# Patient Record
(demographics unavailable — no encounter records)

---

## 2025-02-03 NOTE — HISTORY OF PRESENT ILLNESS
[FreeTextEntry1] : 60 y/o male with h/o cad, epilepsy, htn, crohn's disease, obesity, hl, predm, family h/o early cvd who presents for f/up today.   last seen 8/23  -CTA cor 9/23: Cardiac: 1.  The calcium score is mild at 29 Agatston units, which is at the 56 percentile, adjusted for age, gender and race. 2.  Mild non-obstructive coronary artery disease. Non-cardiac: The visualized aspects of the lungs are unremarkable.  -Echo 9/23: 1. Normal left ventricular cavity size. Left ventricular systolic function is normal. 2. There is normal left ventricular diastolic function. 3. Mild to moderate left ventricular hypertrophy. 4. Normal right ventricular cavity size, normal right ventricular wall thickness and normal right ventricular systolic function. 5. There is mild calcification of the mitral valve annulus. 6. No pericardial effusion seen. 7. Mild aortic regurgitation.   referred for dari evaluation - not done   no cp, sob, syncope, LH, palpitations, edema, orthopnea, pnd  notes snoring   Former patient Dr. Huff  presented to [facility] ER 6/1/2021 for with confusion/drooling/stumbling/"feeling drunk", followed by a witnessed syncopal episode x few seconds, initially admitted to cardiology service for syncope workup which was negative. Continous EEG 6/1-6/3/2021 captured 12 left temporal seizures.  followed by neuro for epilepsy  on keppra    followed by GI for Crohn's    MRI brain w/wout contrast 7/30/2021 unremarkable  echo 6/21 which showed mild LVH (1.12/1.12) and normal LVF 55-60%, trace mr/tr, normal ao root  Calcium score 2019 - zero  normal CTA of his coronary arteries 2015, dilated aortic root (4.0)  carotid 2019: normal    walks for exercise  no mental health issues    PMH/PSH:  predm  hl  epilepsy  crohn's disease  htn  right inguinal hernia  testicular neoplasm 2005 s/p xrt  umbilical hernia  obesity  hernia repair  partial right orchiectomy 2005  septoplasty  cad   ALL:  compazine    MEDS:  norvasc 10 mg qd  keppra 1500 mg qd  lialda 1.2 gm (4 tablets qd)  losartan 100 mg qd  hctz 12.5 mg qd     SH:  no tobacco  social etoh  no drugs (has smoked marijuana/edibles in past)  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

## 2025-02-03 NOTE — DISCUSSION/SUMMARY
[Patient] : the patient [EKG obtained to assist in diagnosis and management of assessed problem(s)] : EKG obtained to assist in diagnosis and management of assessed problem(s) [___ Month(s)] : in [unfilled] month(s) [FreeTextEntry1] : 60 y/o male with h/o cad. epilepsy, htn, crohn's disease, obesity, hl, predm, family h/o early cvd who presents for f/up today.  -will repeat echo 2026 for AI f/up -start asa, statin will review labs to decide  -CTA cor 9/23: Cardiac: 1.  The calcium score is mild at 29 Agatston units, which is at the 56 percentile, adjusted for age, gender and race. 2.  Mild non-obstructive coronary artery disease. Non-cardiac: The visualized aspects of the lungs are unremarkable.  -Echo 9/23: 1. Normal left ventricular cavity size. Left ventricular systolic function is normal. 2. There is normal left ventricular diastolic function. 3. Mild to moderate left ventricular hypertrophy. 4. Normal right ventricular cavity size, normal right ventricular wall thickness and normal right ventricular systolic function. 5. There is mild calcification of the mitral valve annulus. 6. No pericardial effusion seen. 7. Mild aortic regurgitation.  -ekg ordered today - nsr, normal intervals, no st/t changes  -MRI brain w/wout contrast 7/30/2021 unremarkable  -echo 6/21 which showed mild LVH (1.12/1.12) and normal LVF 55-60%, trace mr/tr, normal ao root  -Calcium score 2019 - zero  -normal CTA of his coronary arteries 2015, dilated aortic root (4.0)  -carotid 2019: normal  -continue norvasc, losartan, hctz  -gi f/up for Crohn's disease  -labs reviewed 2023, labs ordered today  -refer for sleep study with Dr. Patel  -counseled on cvd risk factors  -f/up 3 month for htn, lipids    I have spent 40 minutes reviewing labs, records and tests and discussing cvd risk factors, cad, htn

## 2025-05-06 NOTE — DISCUSSION/SUMMARY
[Patient] : the patient [___ Month(s)] : in [unfilled] month(s) [FreeTextEntry1] : 61 y/o male with h/o cad. epilepsy, htn, crohn's disease, obesity, hl, predm, family h/o early cvd who presents for f/up today.  -discussed glp - not interested now - will readdress next visit and will d/w GI given crohn's history -will repeat echo 2026 for AI f/up -advised to start asa, statin -CTA cor 9/23: Cardiac: 1. The calcium score is mild at 29 Agatston units, which is at the 56 percentile, adjusted for age, gender and race. 2. Mild non-obstructive coronary artery disease. Non-cardiac: The visualized aspects of the lungs are unremarkable.  -Echo 9/23: 1. Normal left ventricular cavity size. Left ventricular systolic function is normal. 2. There is normal left ventricular diastolic function. 3. Mild to moderate left ventricular hypertrophy. 4. Normal right ventricular cavity size, normal right ventricular wall thickness and normal right ventricular systolic function. 5. There is mild calcification of the mitral valve annulus. 6. No pericardial effusion seen. 7. Mild aortic regurgitation.  -ekg 2/25 - nsr, normal intervals, no st/t changes  -MRI brain w/wout contrast 7/30/2021 unremarkable  -echo 6/21 which showed mild LVH (1.12/1.12) and normal LVF 55-60%, trace mr/tr, normal ao root  -Calcium score 2019 - zero  -normal CTA of his coronary arteries 2015, dilated aortic root (4.0)  -carotid 2019: normal  -continue norvasc, losartan, hctz  -gi f/up for Crohn's disease (h/o gilbert;s)  -labs reviewed 2025, lipids ordered today  -LpA wnl  -referred for sleep study with Dr. Patel - he wants to avoid for now   -counseled on cvd risk factors  -f/up 3 months for lipids, lft    I have spent 30 minutes reviewing labs, records and tests and discussing cvd risk factors, cad, htn.

## 2025-05-06 NOTE — HISTORY OF PRESENT ILLNESS
[FreeTextEntry1] : 61 y/o male with h/o cad, epilepsy, htn, crohn's disease, obesity, hl, predm, family h/o early cvd who presents for f/up today.   last seen 2/25 crestor prescribed but has not started asa started but has not started  -CTA cor 9/23: Cardiac: 1. The calcium score is mild at 29 Agatston units, which is at the 56 percentile, adjusted for age, gender and race. 2. Mild non-obstructive coronary artery disease. Non-cardiac: The visualized aspects of the lungs are unremarkable.  -Echo 9/23: 1. Normal left ventricular cavity size. Left ventricular systolic function is normal. 2. There is normal left ventricular diastolic function. 3. Mild to moderate left ventricular hypertrophy. 4. Normal right ventricular cavity size, normal right ventricular wall thickness and normal right ventricular systolic function. 5. There is mild calcification of the mitral valve annulus. 6. No pericardial effusion seen. 7. Mild aortic regurgitation.  referred for dari evaluation - not done   no cp, sob, syncope, LH, palpitations, edema, orthopnea, pnd  notes snoring   Former patient Dr. Huff  presented to [facility] ER 6/1/2021 for with confusion/drooling/stumbling/"feeling drunk", followed by a witnessed syncopal episode x few seconds, initially admitted to cardiology service for syncope workup which was negative. Continous EEG 6/1-6/3/2021 captured 12 left temporal seizures.  followed by neuro for epilepsy  on keppra    followed by GI for Crohn's    MRI brain w/wout contrast 7/30/2021 unremarkable  echo 6/21 which showed mild LVH (1.12/1.12) and normal LVF 55-60%, trace mr/tr, normal ao root  Calcium score 2019 - zero  normal CTA of his coronary arteries 2015, dilated aortic root (4.0)  carotid 2019: normal    walks for exercise  no mental health issues    PMH/PSH:  predm  hl  epilepsy  crohn's disease  htn  right inguinal hernia  testicular neoplasm 2005 s/p xrt  umbilical hernia  obesity  hernia repair  partial right orchiectomy 2005  septoplasty  cad   ALL:  compazine    MEDS:  norvasc 10 mg qd  keppra 1500 mg qd  lialda 1.2 gm (4 tablets qd)  losartan 100 mg qd  hctz 12.5 mg qd     SH:  no tobacco  social etoh  no drugs (has smoked marijuana/edibles in past)  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